# Patient Record
Sex: MALE | Race: AMERICAN INDIAN OR ALASKA NATIVE
[De-identification: names, ages, dates, MRNs, and addresses within clinical notes are randomized per-mention and may not be internally consistent; named-entity substitution may affect disease eponyms.]

---

## 2021-12-16 ENCOUNTER — HOSPITAL ENCOUNTER (EMERGENCY)
Dept: HOSPITAL 5 - ED | Age: 36
LOS: 1 days | Discharge: HOME | End: 2021-12-17
Payer: SELF-PAY

## 2021-12-16 DIAGNOSIS — J02.9: Primary | ICD-10-CM

## 2021-12-16 DIAGNOSIS — Z79.899: ICD-10-CM

## 2021-12-16 PROCEDURE — 96374 THER/PROPH/DIAG INJ IV PUSH: CPT

## 2021-12-16 PROCEDURE — 87430 STREP A AG IA: CPT

## 2021-12-16 PROCEDURE — 99283 EMERGENCY DEPT VISIT LOW MDM: CPT

## 2021-12-16 PROCEDURE — 87116 MYCOBACTERIA CULTURE: CPT

## 2021-12-17 VITALS — DIASTOLIC BLOOD PRESSURE: 79 MMHG | SYSTOLIC BLOOD PRESSURE: 130 MMHG

## 2021-12-17 NOTE — EMERGENCY DEPARTMENT REPORT
ED General Adult HPI





- General


Chief complaint: Sore Throat


Stated complaint: SORE THROAT


Time Seen by Provider: 12/17/21 00:43


Source: patient


Mode of arrival: Ambulatory


Limitations: No Limitations





- History of Present Illness


Initial comments: 


Patient 36-year-old male who presents with sore throat x4 days.  Patient states 

pain at 4/10 scribed as burning exacerbated by swallowing.  Is been no fevers no

chills no nausea no vomiting no ear pain.  Patient is tole rating p.o. intake.  

There is no wheezing no stridor no shortness of breath.  He denies other 

exacerbating or relieving factors.





Severity scale (0 -10): 4





- Related Data


                                  Previous Rx's











 Medication  Instructions  Recorded  Last Taken  Type


 


Amoxicillin/Potassium Clav 1 each PO BID 7 Days #14 tablet 12/17/21 Unknown Rx





[Augmentin 875-125 Tablet]    


 


Ibuprofen [Motrin 800 MG tab] 800 mg PO Q8HR PRN #30 tablet 12/17/21 Unknown Rx


 


dexAMETHasone [Decadron] 4 mg PO BID 3 Days #6 tablet 12/17/21 Unknown Rx











                                    Allergies











Allergy/AdvReac Type Severity Reaction Status Date / Time


 


No Known Allergies Allergy   Unverified 12/16/21 23:35














ED Review of Systems


ROS: 


Stated complaint: SORE THROAT


Other details as noted in HPI





Constitutional: chills, malaise


Eyes: denies: eye pain, eye discharge, vision change


ENT: throat pain, congestion.  denies: ear pain


Respiratory: denies: cough, shortness of breath, wheezing


Cardiovascular: denies: chest pain, palpitations


Endocrine: no symptoms reported


Gastrointestinal: denies: abdominal pain, nausea, vomiting, diarrhea


Genitourinary: denies: urgency, dysuria


Musculoskeletal: denies: back pain, joint swelling, arthralgia


Skin: denies: rash, lesions


Neurological: denies: headache, weakness, paresthesias, vertigo


Psychiatric: denies: anxiety, depression


Hematological/Lymphatic: denies: easy bleeding, easy bruising





ED Past Medical Hx





- Past Medical History


Previous Medical History?: No





- Surgical History


Past Surgical History?: No





- Medications


Home Medications: 


                                Home Medications











 Medication  Instructions  Recorded  Confirmed  Last Taken  Type


 


Amoxicillin/Potassium Clav 1 each PO BID 7 Days #14 tablet 12/17/21  Unknown Rx





[Augmentin 875-125 Tablet]     


 


Ibuprofen [Motrin 800 MG tab] 800 mg PO Q8HR PRN #30 tablet 12/17/21  Unknown Rx


 


dexAMETHasone [Decadron] 4 mg PO BID 3 Days #6 tablet 12/17/21  Unknown Rx














ED Physical Exam





- General


Limitations: No Limitations


General appearance: alert, in no apparent distress





- Head


Head exam: Present: normocephalic, normal inspection





- Eye


Eye exam: Present: normal appearance, PERRL, EOMI.  Absent: conjunctival 

injection, nystagmus


Pupils: Present: normal accommodation





- ENT


ENT exam: Present: mucous membranes moist





- Expanded ENT Exam


  ** Expanded


Ear exam: Present: normal external inspection


Mouth exam: Present: tongue normal.  Absent: trismus


Throat exam: Positive: tonsillar erythema, tonsillomegaly, tonsillar exudate, 

other (uvula midline no lesions no stridor airway is patent, ).  Negative: R 

peritonsillar mass, L peritonsillar mass





- Neck


Neck exam: Present: normal inspection, full ROM, lymphadenopathy.  Absent: 

tenderness, meningismus, thyromegaly





- Expanded Neck Exam


  ** Expanded


Neck exam: Absent: midline deformity, anterior neck swelling, thyroid mass, 

carotid bruit, tracheal deviation





- Respiratory


Respiratory exam: Present: normal lung sounds bilaterally.  Absent: respiratory 

distress, wheezes, rales, rhonchi, stridor, chest wall tenderness





- Cardiovascular


Cardiovascular Exam: Present: regular rate, normal rhythm, normal heart sounds. 

 Absent: systolic murmur, diastolic murmur, rubs, gallop





- GI/Abdominal


GI/Abdominal exam: Present: soft, normal bowel sounds.  Absent: distended, 

tenderness, bruit, hernia





- Rectal


Rectal exam: Present: deferred





- Extremities Exam


Extremities exam: Present: normal inspection, full ROM.  Absent: tenderness





- Back Exam


Back exam: Present: normal inspection, full ROM.  Absent: tenderness





- Neurological Exam


Neurological exam: Present: alert, oriented X3, CN II-XII intact, normal gait





- Psychiatric


Psychiatric exam: Present: normal affect, normal mood





- Skin


Skin exam: Present: warm, dry, intact, normal color.  Absent: rash





ED Course





                                   Vital Signs











  12/16/21





  23:21


 


Temperature 98.0 F


 


Pulse Rate 96 H


 


Respiratory 18





Rate 


 


Blood Pressure 131/85


 


O2 Sat by Pulse 95





Oximetry 














ED Medical Decision Making





- Lab Data








Labs











  12/17/21





  Unknown


 


Group A Strep Rapid  Negative














- Medical Decision Making


Plan treat for pharyngitis, patient DC'd with prescriptions, follow-up primary 

care doctor in 2 to 3 days.  Return to emergency department should symptoms 

worsen. There is no peritonsillar abscess.


Critical care attestation.: 


If time is entered above; I have spent that time in minutes in the direct care 

of this critically ill patient, excluding procedure time.








ED Disposition


Clinical Impression: 


 Pharyngitis





Disposition: 01 HOME / SELF CARE / HOMELESS


Is pt being admited?: No


Does the pt Need Aspirin: No


Condition: Stable


Instructions:  Pharyngitis


Additional Instructions: 


Take medications as prescribed,, warm liquids, hydrate, follow-up with your 

doctor in 2 to 3 days.  Return to emergency should symptoms worsen.


Prescriptions: 


Amoxicillin/Potassium Clav [Augmentin 875-125 Tablet] 1 each PO BID 7 Days #14 

tablet


dexAMETHasone [Decadron] 4 mg PO BID 3 Days #6 tablet


Ibuprofen [Motrin 800 MG tab] 800 mg PO Q8HR PRN #30 tablet


 PRN Reason: Pain


Referrals: 


KATHLEEN MCKINNON MD [Staff Physician] - 3-5 Days


Forms:  Work/School Release Form(ED)


Time of Disposition: 02:17